# Patient Record
Sex: MALE | Race: WHITE | Employment: OTHER | URBAN - METROPOLITAN AREA
[De-identification: names, ages, dates, MRNs, and addresses within clinical notes are randomized per-mention and may not be internally consistent; named-entity substitution may affect disease eponyms.]

---

## 2017-11-28 ENCOUNTER — OFFICE VISIT (OUTPATIENT)
Dept: FAMILY MEDICINE CLINIC | Age: 72
End: 2017-11-28

## 2017-11-28 VITALS
OXYGEN SATURATION: 95 % | WEIGHT: 161 LBS | HEART RATE: 69 BPM | RESPIRATION RATE: 18 BRPM | HEIGHT: 70 IN | BODY MASS INDEX: 23.05 KG/M2 | TEMPERATURE: 98.5 F | DIASTOLIC BLOOD PRESSURE: 48 MMHG | SYSTOLIC BLOOD PRESSURE: 123 MMHG

## 2017-11-28 DIAGNOSIS — F39 MOOD DISORDER (HCC): ICD-10-CM

## 2017-11-28 DIAGNOSIS — K92.2 UGIB (UPPER GASTROINTESTINAL BLEED): Primary | ICD-10-CM

## 2017-11-28 DIAGNOSIS — M25.50 ARTHRALGIA, UNSPECIFIED JOINT: ICD-10-CM

## 2017-11-28 RX ORDER — ONDANSETRON 4 MG/1
4 TABLET, FILM COATED ORAL
COMMUNITY

## 2017-11-28 RX ORDER — SERTRALINE HYDROCHLORIDE 100 MG/1
TABLET, FILM COATED ORAL DAILY
COMMUNITY

## 2017-11-28 RX ORDER — FAMOTIDINE 20 MG/1
20 TABLET, FILM COATED ORAL 2 TIMES DAILY
COMMUNITY

## 2017-11-28 NOTE — PROGRESS NOTES
Chief Complaint   Patient presents with    Establish Care    Hypertension     1. Have you been to the ER, urgent care clinic since your last visit? Hospitalized since your last visit? Yes When: 11/14/2017 Where: [facility]  Reason for visit: HTN    2. Have you seen or consulted any other health care providers outside of the [address] [place] [address] since your last visit? Include any pap smears or colon screening. No     HPI  Quirino Saxena comes in accompanied by the wife for visit following hospital care. Patient has his PCP Dr. Sp Knight. He did have some misunderstanding with his PCP. Patient does have memory difficulties and anger management issues. He gets annoyed pretty easily. He does states that he had wanted to be seen by his PCP but the PCP was unavailable in the owing to this he got annoyed and went to see another physician. While there his blood pressure was noted to be elevated and he was referred to the emergency room. She was also found to have upper GI bleed. I did inquire as to have the upper GI bleed was diagnosed and the he does states this was by radiological studies. He was then referred out to follow-up with his primary care provider and he comes in to see me today. He is unsure as to whether he wants to leave his previous PCP. I did have a discussion about anger management and making of abrupt decisions. His wife would want him to continue seeing his previous PCP. He is also undecided and feels that he will go back to follow-up with his previous PCP. We did have a conversation about how to manage anger and how to understand situations even when they are not going as we would like. For now after our discussion he will reestablish with his previous PCP and that he will call to set up an appointment. In the meantime we did talk about his health issues. He has a history of upper GI bleed. He has been on aspirin and diclofenac.   I did advise that he stop these medications until he sees his PCP. He denies hematemesis or vomiting blood. Currently does not have any dark stools. He denies any abdominal pain. Overall he feels stable. His most recent labs from the hospital are stable. He will need an EGD. He had been put on Pepcid. May benefit from a PPI and I did advise that they buy some Prilosec to take daily. Mood disorder: Patient is on Zoloft and Celexa. Takes 100 mg of the Zoloft. He will follow-up with his primary care provider for any medication adjustments. Rheumatoid arthritis: Patient has a history of rheumatoid arthritis and he is followed up by the rheumatologist.  Currently he has no active synovitis. He will take tramadol as needed for pain. He is on methotrexate. Past Medical History  Past Medical History:   Diagnosis Date    Anxiety     Back pain     Balance problem     Brain aneurysm 2010    no problems    Bunion of great toe of right foot 3/12/2014    Concussion 2010    Hypertension     Left ankle pain     Metatarsalgia of right foot 3/12/2014    OA (osteoarthritis) of foot 3/12/2014    Right great toe     Rheumatoid arthritis(714.0)     Sleep disorder     Tremor     Wears glasses        Surgical History  Past Surgical History:   Procedure Laterality Date    HX APPENDECTOMY      HX HERNIA REPAIR Bilateral     several on each side    HX ORTHOPAEDIC Left     Ankle, 1960s    HX ORTHOPAEDIC      Neck, 1990s    HX ORTHOPAEDIC Right     wrist, 2013    HX ORTHOPAEDIC      Back, 1980s    HX ORTHOPAEDIC Right     Shoulder - Thoracic outlet, 1970s    HX TONSILLECTOMY  as a child    NEUROLOGICAL PROCEDURE UNLISTED      subdural hematoma        Medications  Current Outpatient Prescriptions   Medication Sig Dispense Refill    sertraline (ZOLOFT) 100 mg tablet Take  by mouth daily. 150 MG      famotidine (PEPCID) 20 mg tablet Take 20 mg by mouth two (2) times a day.       ondansetron hcl (ZOFRAN, AS HYDROCHLORIDE,) 4 mg tablet Take 4 mg by mouth every eight (8) hours as needed for Nausea.  tramadol (ULTRAM) 50 mg tablet Take 50 mg by mouth every eight (8) hours as needed for Pain.  OTHER Infusion for Rheumatoid Arthritis  - began 3 weeks ago      citalopram (CELEXA) 20 mg tablet Take  by mouth daily.  methotrexate (RHEUMATREX) 2.5 mg tablet Take 9 mg by mouth every Tuesday. Indications: RHEUMATOID ARTHRITIS      gabapentin (NEURONTIN) 100 mg capsule Take  by mouth three (3) times daily.  aspirin 81 mg chewable tablet Take 81 mg by mouth daily.  folic acid (FOLVITE) 1 mg tablet Take  by mouth daily.  multivitamins-minerals-lutein (CENTRUM SILVER) tab tablet Take 1 Tab by mouth daily.  oxyCODONE-acetaminophen (PERCOCET) 7.5-325 mg per tablet 1 to 2 tablets q4-6 h prn pain 50 Tab 0       Allergies  Allergies   Allergen Reactions    Lisinopril Swelling     Swelling of face, lips    Sulfur Rash     Was told he had this,  Has had no RXN of recent times       Family History  Family History   Problem Relation Age of Onset    Heart Disease Mother     Heart Attack Mother     Arthritis-osteo Other        Social History  Social History     Social History    Marital status:      Spouse name: N/A    Number of children: N/A    Years of education: N/A     Occupational History    Not on file.      Social History Main Topics    Smoking status: Former Smoker    Smokeless tobacco: Never Used      Comment: quit smoking approx 5-6 years ago    Alcohol use No      Comment: Very Seldom    Drug use: No    Sexual activity: Not on file     Other Topics Concern    Not on file     Social History Narrative       Review of Systems  Review of Systems - History obtained from spouse, chart review and the patient  General ROS: positive for  - fatigue, malaise and sleep disturbance  Psychological ROS: positive for - anxiety, hostility, irritability, memory difficulties and mood swings  Ophthalmic ROS: negative  ENT ROS: negative  Allergy and Immunology ROS: negative  Hematological and Lymphatic ROS: negative  Respiratory ROS: no cough, shortness of breath, or wheezing  Cardiovascular ROS: no chest pain or dyspnea on exertion  Gastrointestinal ROS: no abdominal pain, change in bowel habits, or black or bloody stools  Genito-Urinary ROS: negative  Musculoskeletal ROS: positive for - joint pain, joint stiffness and muscle pain  Neurological ROS: positive for - memory loss  Dermatological ROS: negative    Vital Signs  Visit Vitals    /48 (BP 1 Location: Right arm, BP Patient Position: Sitting)    Pulse 69    Temp 98.5 °F (36.9 °C) (Oral)    Resp 18    Ht 5' 10\" (1.778 m)    Wt 161 lb (73 kg)    SpO2 95%    BMI 23.1 kg/m2         Physical Exam  Physical Examination: General appearance - acyanotic, in no respiratory distress, anxious and chronically ill appearing  Mental status - alert, oriented to person, place, and time, affect appropriate to mood  Mouth - mucous membranes moist, pharynx normal without lesions  Neck - supple, no significant adenopathy  Chest - clear to auscultation, no wheezes, rales or rhonchi, symmetric air entry  Heart - normal rate and regular rhythm, S1 and S2 normal  Abdomen - no rebound tenderness noted  Neurological - neck supple without rigidity, motor and sensory grossly normal bilaterally  Musculoskeletal - osteoarthritic changes noted in both hands  Extremities - no pedal edema noted, intact peripheral pulses    Results  No results found for this or any previous visit. ASSESSMENT and PLAN    ICD-10-CM ICD-9-CM    1. UGIB (upper gastrointestinal bleed) K92.2 578.9    2. Mood disorder (HCC) F39 296.90    3.  Arthralgia, unspecified joint M25.50 719.40      lab results and schedule of future lab studies reviewed with patient  reviewed diet, exercise and weight control  reviewed medications and side effects in detail    I have discussed the diagnosis with the patient and the intended plan of care as seen in the above orders. The patient has received an after-visit summary and questions were answered concerning future plans. I have discussed medication, side effects, and warnings with the patient in detail. The patient verbalized understanding and is in agreement with the plan of care. The patient will contact the office with any additional concerns.     Gabino Zepeda MD

## 2017-11-28 NOTE — PATIENT INSTRUCTIONS
Upper Gastrointestinal Bleeding: Care Instructions  Your Care Instructions    The digestive or gastrointestinal tract goes from the mouth to the anus. It is often called the GI tract. Bleeding in the upper GI tract can happen anywhere from the esophagus to the first part of the small intestine. Sometimes it's caused by an ulcer in your stomach. Or it may be caused by blood vessels in your esophagus. Your esophagus is the tube that carries food from your throat to your stomach. Light bleeding may not cause any symptoms at first. But if you continue to bleed for a while, you may feel very weak or tired. Sudden, heavy bleeding means you need to see a doctor right away. This kind of bleeding can be very dangerous. But it can usually be cured or controlled. The doctor may do some tests to find the cause of your bleeding. Follow-up care is a key part of your treatment and safety. Be sure to make and go to all appointments, and call your doctor if you are having problems. It's also a good idea to know your test results and keep a list of the medicines you take. How can you care for yourself at home? · Be safe with medicines. Take your medicines exactly as prescribed. Call your doctor if you think you are having a problem with your medicine. You will get more details on the specific medicines your doctor prescribes. · Do not take aspirin or other anti-inflammatory medicines, such as naproxen (Aleve) or ibuprofen (Advil, Motrin), without talking to your doctor first. Ask your doctor if it is okay to use acetaminophen (Tylenol). · Do not drink alcohol. · The bleeding may make you lose iron. So it's important to eat foods that have a lot of iron. These include red meat, shellfish, poultry, and eggs. They also include beans, raisins, whole-grain breads, and leafy green vegetables. If you want help planning meals, you can meet with a dietitian. When should you call for help?   Call 911 anytime you think you may need emergency care. For example, call if:  ? · You have sudden, severe belly pain. ? · You vomit blood or what looks like coffee grounds. ? · You passed out (lost consciousness). ? · Your stools are maroon or very bloody. ?Call your doctor now or seek immediate medical care if:  ? · You are dizzy or lightheaded, or you feel like you may faint. ? · Your stools are black and look like tar.   ? · You have belly pain. ? · You vomit or have nausea. ? · You have trouble swallowing, or it hurts when you swallow. ? Watch closely for changes in your health, and be sure to contact your doctor if you do not get better as expected. Where can you learn more? Go to http://abran-tavo.info/. Enter V127 in the search box to learn more about \"Upper Gastrointestinal Bleeding: Care Instructions. \"  Current as of: March 20, 2017  Content Version: 11.4  © 8510-1683 [employer] [place] [employer]. Care instructions adapted under license by [employer] (which disclaims liability or warranty for this information). If you have questions about a medical condition or this instruction, always ask your healthcare professional. [place] [address] any warranty or liability for your use of this information. Patient was seen previously with upper GIB. Advised to hold off on diclofenac and aspirin until he is seen by his PCP Dr. Sharri Mcardle.

## 2017-11-28 NOTE — MR AVS SNAPSHOT
Visit Information Date & Time Provider Department Dept. Phone Encounter #  
 11/28/2017  3:30 PM Federica Griffith [address] [address] [facility] P[facility]kh [phone] [id number] Follow-up Instructions Return if symptoms worsen or fail to improve. Upcoming Health Maintenance Date Due DTaP/Tdap/Td series (1 - Tdap) 5/14/1965 FOBT Q 1 YEAR AGE 50-75 5/14/1994 ZOSTER VACCINE AGE 60> 3/14/2004 GLAUCOMA SCREENING Q2Y 5/14/2009 MEDICARE YEARLY EXAM 5/14/2009 Influenza Age 5 to Adult 8/1/2017 Pneumococcal 65+ Low/Medium Risk (2 of 2 - PPSV23) 11/28/2018 Allergies as of 11/28/2017  Review Complete On: 11/28/2017 By: Brooklyn Wu MD  
  
 Severity Noted Reaction Type Reactions Lisinopril  09/19/2014    Swelling Swelling of face, lips Sulfur  03/12/2014    Rash Was told he had this,  Has had no RXN of recent times Current Immunizations  Never Reviewed No immunizations on file. Not reviewed this visit You Were Diagnosed With   
  
 Codes Comments UGIB (upper gastrointestinal bleed)    -  Primary ICD-10-CM: K92.2 ICD-9-CM: 578.9 Vitals BP Pulse Temp Resp Height(growth percentile) Weight(growth percentile) 123/48 (BP 1 Location: Right arm, BP Patient Position: Sitting) 69 98.5 °F (36.9 °C) (Oral) 18 5' 10\" (1.778 m) 161 lb (73 kg) SpO2 BMI Smoking Status 95% 23.1 kg/m2 Former Smoker BMI and BSA Data Body Mass Index Body Surface Area  
 23.1 kg/m 2 1.9 m 2 Preferred Pharmacy Pharmacy Name Phone [id number] [address] [phone] Your Updated Medication List  
  
   
This list is accurate as of: 11/28/17  4:24 PM.  Always use your most recent med list.  
  
  
  
  
 aspirin 81 mg chewable tablet Take 81 mg by mouth daily. CENTRUM SILVER Tab tablet Generic drug:  multivitamins-minerals-lutein Take 1 Tab by mouth daily. citalopram 20 mg tablet Commonly known as:  [place] Take  by mouth daily. folic acid 1 mg tablet Commonly known as:  [employer] Take  by mouth daily. gabapentin 100 mg capsule Commonly known as:  NEURONTIN Take  by mouth three (3) times daily. methotrexate 2.5 mg tablet Commonly known as:  Mari Putt Take 9 mg by mouth every Tuesday. Indications: RHEUMATOID ARTHRITIS  
  
 OTHER Infusion for Rheumatoid Arthritis  - began 3 weeks ago  
  
 oxyCODONE-acetaminophen 7.5-325 mg per tablet Commonly known as:  PERCOCET  
1 to 2 tablets q4-6 h prn pain PEPCID 20 mg tablet Generic drug:  famotidine Take 20 mg by mouth two (2) times a day. ULTRAM 50 mg tablet Generic drug:  traMADol Take 50 mg by mouth every eight (8) hours as needed for Pain. ZOFRAN (AS HYDROCHLORIDE) 4 mg tablet Generic drug:  ondansetron hcl Take 4 mg by mouth every eight (8) hours as needed for Nausea. ZOLOFT 100 mg tablet Generic drug:  sertraline Take  by mouth daily. 150 MG Follow-up Instructions Return if symptoms worsen or fail to improve. Patient Instructions Upper Gastrointestinal Bleeding: Care Instructions Your Care Instructions The digestive or gastrointestinal tract goes from the mouth to the anus. It is often called the GI tract. Bleeding in the upper GI tract can happen anywhere from the esophagus to the first part of the small intestine. Sometimes it's caused by an ulcer in your stomach. Or it may be caused by blood vessels in your esophagus. Your esophagus is the tube that carries food from your throat to your stomach. Light bleeding may not cause any symptoms at first. But if you continue to bleed for a while, you may feel very weak or tired. Sudden, heavy bleeding means you need to see a doctor right away. This kind of bleeding can be very dangerous.  But it can usually be cured or controlled. The doctor may do some tests to find the cause of your bleeding. Follow-up care is a key part of your treatment and safety. Be sure to make and go to all appointments, and call your doctor if you are having problems. It's also a good idea to know your test results and keep a list of the medicines you take. How can you care for yourself at home? · Be safe with medicines. Take your medicines exactly as prescribed. Call your doctor if you think you are having a problem with your medicine. You will get more details on the specific medicines your doctor prescribes. · Do not take aspirin or other anti-inflammatory medicines, such as naproxen (Aleve) or ibuprofen (Advil, Motrin), without talking to your doctor first. Ask your doctor if it is okay to use acetaminophen (Tylenol). · Do not drink alcohol. · The bleeding may make you lose iron. So it's important to eat foods that have a lot of iron. These include red meat, shellfish, poultry, and eggs. They also include beans, raisins, whole-grain breads, and leafy green vegetables. If you want help planning meals, you can meet with a dietitian. When should you call for help? Call 911 anytime you think you may need emergency care. For example, call if: 
? · You have sudden, severe belly pain. ? · You vomit blood or what looks like coffee grounds. ? · You passed out (lost consciousness). ? · Your stools are maroon or very bloody. ?Call your doctor now or seek immediate medical care if: 
? · You are dizzy or lightheaded, or you feel like you may faint. ? · Your stools are black and look like tar.  
? · You have belly pain. ? · You vomit or have nausea. ? · You have trouble swallowing, or it hurts when you swallow. ? Watch closely for changes in your health, and be sure to contact your doctor if you do not get better as expected. Where can you learn more? Go to http://tina.info/. Enter X943 in the search box to learn more about \"Upper Gastrointestinal Bleeding: Care Instructions. \" Current as of: March 20, 2017 Content Version: 11.4 © 4639-2125 [employer]. Care instructions adapted under license by [employer] (which disclaims liability or warranty for this information). If you have questions about a medical condition or this instruction, always ask your healthcare professional. [address] any warranty or liability for your use of this information. Patient was seen previously with upper GIB. Advised to hold off on diclofenac and aspirin until he is seen by his PCP Dr. Marieta Dakins. Introducing [facility]! Shady Disha introduces Agenda patient portal. Now you can access parts of your medical record, email your doctor's office, and request medication refills online. 1. In your internet browser, go to [email or web address] 2. Click on the First Time User? Click Here link in the Sign In box. You will see the New Member Sign Up page. 3. Enter your Agenda Access Code exactly as it appears below. You will not need to use this code after youve completed the sign-up process. If you do not sign up before the expiration date, you must request a new code. · Agenda Access Code: [email or web address] Expires: 2/26/2018  7:58 AM 
 
4. Enter the last four digits of your Social Security Number (xxxx) and Date of Birth (mm/dd/yyyy) as indicated and click Submit. You will be taken to the next sign-up page. 5. Create a Agenda ID. This will be your Agenda login ID and cannot be changed, so think of one that is secure and easy to remember. 6. Create a Agenda password. You can change your password at any time. 7. Enter your Password Reset Question and Answer. This can be used at a later time if you forget your password. 8. Enter your e-mail address.  You will receive e-mail notification when new information is available in [employer]cient. 9. Click Sign Up. You can now view and download portions of your medical record. 10. Click the Download Summary menu link to download a portable copy of your medical information. If you have questions, please visit the Frequently Asked Questions section of the [employer] website. Remember, [employer] is NOT to be used for urgent needs. For medical emergencies, dial 911. Now available from your iPhone and Android! Please provide this summary of care documentation to your next provider. Your primary care clinician is listed as Federica Meade. If you have any questions after today's visit, please call [phone].